# Patient Record
Sex: FEMALE | Race: WHITE | NOT HISPANIC OR LATINO | Employment: PART TIME | URBAN - METROPOLITAN AREA
[De-identification: names, ages, dates, MRNs, and addresses within clinical notes are randomized per-mention and may not be internally consistent; named-entity substitution may affect disease eponyms.]

---

## 2017-05-30 ENCOUNTER — ALLSCRIPTS OFFICE VISIT (OUTPATIENT)
Dept: OTHER | Facility: OTHER | Age: 57
End: 2017-05-30

## 2018-01-14 VITALS
BODY MASS INDEX: 39.2 KG/M2 | WEIGHT: 221.25 LBS | HEIGHT: 63 IN | SYSTOLIC BLOOD PRESSURE: 132 MMHG | DIASTOLIC BLOOD PRESSURE: 82 MMHG

## 2018-07-31 ENCOUNTER — TRANSCRIBE ORDERS (OUTPATIENT)
Dept: ADMINISTRATIVE | Facility: HOSPITAL | Age: 58
End: 2018-07-31

## 2018-07-31 DIAGNOSIS — Z12.39 SCREENING BREAST EXAMINATION: Primary | ICD-10-CM

## 2018-08-06 ENCOUNTER — HOSPITAL ENCOUNTER (OUTPATIENT)
Dept: MAMMOGRAPHY | Facility: MEDICAL CENTER | Age: 58
Discharge: HOME | End: 2018-08-06
Payer: COMMERCIAL

## 2018-08-06 DIAGNOSIS — Z12.39 SCREENING BREAST EXAMINATION: ICD-10-CM

## 2018-08-06 PROCEDURE — 77067 SCR MAMMO BI INCL CAD: CPT

## 2018-08-06 PROCEDURE — 77063 BREAST TOMOSYNTHESIS BI: CPT

## 2018-08-13 ENCOUNTER — ANNUAL EXAM (OUTPATIENT)
Dept: GYNECOLOGY | Facility: CLINIC | Age: 58
End: 2018-08-13
Payer: COMMERCIAL

## 2018-08-13 VITALS
SYSTOLIC BLOOD PRESSURE: 138 MMHG | HEIGHT: 65 IN | BODY MASS INDEX: 39.79 KG/M2 | WEIGHT: 238.8 LBS | DIASTOLIC BLOOD PRESSURE: 88 MMHG

## 2018-08-13 DIAGNOSIS — Z01.419 ENCOUNTER FOR ROUTINE GYNECOLOGICAL EXAMINATION WITH PAPANICOLAOU SMEAR OF CERVIX: Primary | ICD-10-CM

## 2018-08-13 DIAGNOSIS — Z12.4 ENCOUNTER FOR PAPANICOLAOU SMEAR FOR CERVICAL CANCER SCREENING: ICD-10-CM

## 2018-08-13 PROCEDURE — 99396 PREV VISIT EST AGE 40-64: CPT | Performed by: NURSE PRACTITIONER

## 2018-08-13 RX ORDER — PRAMIPEXOLE DIHYDROCHLORIDE 0.12 MG/1
0.12 TABLET ORAL DAILY
COMMUNITY

## 2018-08-13 NOTE — PROGRESS NOTES
Subjective      Mikey Shannon is a 63 y o  female who presents for annual exam  The patient is post menopausal and voices no complaints today  The patient is sexually active  GYN screening history: last pap: was normal and last mammogram: was normal  The patient is not taking hormone replacement therapy  Patient denies post-menopausal vaginal bleeding      The patient participates in regular exercise: no  She relates that she has an occasional hot flash but overall these are tolerable  History of abnormal Pap smear: no  Family history of breast cancer: no  Past Medical History:   Diagnosis Date    Attention deficit disorder     Supraventricular tachycardia ([address] )     Vein disorder      Family History   Problem Relation Age of Onset    Hypertension Mother     Diabetes Mother     Hyperlipidemia Mother     Pancreatic cancer Father     Hypertension Sister     Hypertension Brother      Past Surgical History:   Procedure Laterality Date    APPENDECTOMY      INDUCED        Social History     Social History    Marital status: /Civil Union     Spouse name: N/A    Number of children: N/A    Years of education: N/A     Occupational History    Not on file       Social History Main Topics    Smoking status: Former Smoker    Smokeless tobacco: Never Used    Alcohol use Yes      Comment: moderate    Drug use: No    Sexual activity: Yes     Partners: Male     Birth control/ protection: Post-menopausal     Other Topics Concern    Not on file     Social History Narrative    No narrative on file       Current Outpatient Prescriptions:     pramipexole (MIRAPEX) 0 125 mg tablet, Take 0 125 mg by mouth daily, Disp: , Rfl:       Review of Systems  Constitutional :no fever, feels well, no tiredness, no recent weight gain or loss  Cardiovascular: no complaints of slow or fast heart beat, no chest pain, no palpitations  Respiratory: no complaints of shortness of shortness of breath, no BALL  Breasts:no complaints of breast pain, breast lump, or nipple discharge  Gastrointestinal: no complaints of abdominal pain, constipation,nausea, vomiting, or diarrhea or bloody stools  Genitourinary : no complaints of dysuria, incontinence, pelvic pain, dysmenorrhea,vaginal discharge or abnormal vaginal bleeding  Integumentary: no complaints of skin rash or lesion,itching or dry skin  Neurological: no complaints of headache,numbness, tingling, dizziness or fainting       Objective      /88   Ht 5' 5" (1 651 m)   Wt 108 kg (238 lb 12 8 oz) Comment: 65  BMI 39 74 kg/m²     General:   appears stated age","cooperative","alert" normal mood and affect   Neck: Neck: normal, supple,trachea midline, no masses   Heart: regular rate and rhythm, S1, S2 normal, no murmur, click, rub or gallop   Lungs: clear to auscultation bilaterally   Breasts: Breast exam :normal, no dimpling or skin changes noted   Abdomen: soft, non-tender, without masses or organomegaly   Vulva: Normal , no lesions   Vagina: normal , no lesions or dryness   Urethra: normal   Cervix: Normal, no palpable masses A pap smear with HPV was done   Uterus: Normal , non-tender,not enlarged,no palpable masses   Adnexa: Normal, non-tender without fullness or masses                         Assessment     Normal GYN exam     Plan      All questions answered  Await pap smear results  Breast self exam technique reviewed and patient encouraged to perform self-exam monthly  Dietary diary  Follow up as needed  Mammogram   Thin prep Pap smear  The pt  declines a colonoscopy but states that she just did the ColoGard test     She is UTD with her osteoporosis screening

## 2018-08-14 LAB
CYTOLOGIST CVX/VAG CYTO: NORMAL
DX ICD CODE: NORMAL
HPV I/H RISK 1 DNA CVX QL PROBE+SIG AMP: NEGATIVE
OTHER STN SPEC: NORMAL
PATH REPORT.FINAL DX SPEC: NORMAL
SL AMB NOTE:: NORMAL
SL AMB SPECIMEN ADEQUACY: NORMAL
SL AMB TEST METHODOLOGY: NORMAL

## 2021-03-31 DIAGNOSIS — Z23 ENCOUNTER FOR IMMUNIZATION: ICD-10-CM

## 2021-05-05 ENCOUNTER — TRANSCRIBE ORDERS (OUTPATIENT)
Dept: ADMINISTRATIVE | Facility: HOSPITAL | Age: 61
End: 2021-05-05

## 2021-05-05 DIAGNOSIS — Z12.31 SCREENING MAMMOGRAM FOR HIGH-RISK PATIENT: Primary | ICD-10-CM

## 2021-05-11 NOTE — PROGRESS NOTES
Assessment/Plan:    Discussed atrophy/vaginal estrogen cream/lubricant  Patient will start premarin vaginal cream 2x per week  Risks/benefits/instructions for use reviewed  Recommended monthly SBE, annual CBE and annual screening mammo  ASCCP guidelines reviewed and pap with cotesting done today  Colon screening UTD  The patient denies STI risk factors and declines testing at this time  Reviewed diet/activity recommendations Calcium 1913-9235 mg and Vit D 600-1000 IU daily  Discussed postmenopausal considerations and symptoms to report  Kegel exercises as instructed  RTO in one year for routine annual gyn exam or sooner PRN  Diagnoses and all orders for this visit:    Encounter for gynecological examination (general) (routine) without abnormal findings    Encounter for Papanicolaou smear for cervical cancer screening  -     Liquid-based pap, screening    Vaginal atrophy  -     conjugated estrogens (PREMARIN) vaginal cream; Insert 0 5 g into the vagina 2 (two) times a week    Dyspareunia in female  -     conjugated estrogens (PREMARIN) vaginal cream; Insert 0 5 g into the vagina 2 (two) times a week    Subjective:      Patient ID: Madonna Damon is a 62 y o  female  This patient presents for routine annual gyn exam  She was last seen in our office by LAMONT Romero 5/44/[birth year]  Reports vaginal dryness with dyspareunia  She denies  bleeding or spotting, VM sx, pelvic pain, breast concerns, abnormal discharge, bowel/bladder dysfunction, depression/anx  , sexually active and is monogamous  Denies STI concerns  No hx of STIs  Pap/HPV up to date and normal, 8/13/18  Mammography ordered  Colon screening done with Cologuard in 2021          The following portions of the patient's history were reviewed and updated as appropriate: allergies, current medications, past family history, past medical history, past social history, past surgical history and problem list     Review of Systems Constitutional: Negative  Respiratory: Negative  Cardiovascular: Negative  Gastrointestinal: Negative  Endocrine: Negative  Genitourinary: Positive for dyspareunia  Negative for dysuria, frequency, pelvic pain, urgency, vaginal bleeding, vaginal discharge and vaginal pain  Musculoskeletal: Negative  Skin: Negative  Neurological: Negative  Psychiatric/Behavioral: Negative  Objective:      /72   Pulse 78   Ht 5' 2 5" (1 588 m)   Wt 102 kg (225 lb 12 8 oz)   BMI 40 64 kg/m²          Physical Exam  Vitals signs and nursing note reviewed  Exam conducted with a chaperone present  Constitutional:       Appearance: Normal appearance  She is well-developed  HENT:      Head: Normocephalic and atraumatic  Neck:      Musculoskeletal: Normal range of motion and neck supple  Thyroid: No thyroid mass or thyromegaly  Cardiovascular:      Rate and Rhythm: Normal rate and regular rhythm  Heart sounds: Normal heart sounds  Pulmonary:      Effort: Pulmonary effort is normal       Breath sounds: Normal breath sounds  Chest:      Breasts: Breasts are symmetrical          Right: No inverted nipple, mass, nipple discharge, skin change or tenderness  Left: No inverted nipple, mass, nipple discharge, skin change or tenderness  Abdominal:      General: Bowel sounds are normal       Palpations: Abdomen is soft  Tenderness: There is no abdominal tenderness  Hernia: There is no hernia in the left inguinal area or right inguinal area  Genitourinary:     General: Normal vulva  Exam position: Supine  Pubic Area: No rash  Labia:         Right: No rash, tenderness, lesion or injury  Left: No rash, tenderness, lesion or injury  Urethra: No prolapse, urethral pain, urethral swelling or urethral lesion  Vagina: Normal  No signs of injury and foreign body   No vaginal discharge, erythema, tenderness, bleeding, lesions or prolapsed vaginal walls  Cervix: No cervical motion tenderness, discharge, friability, lesion, erythema, cervical bleeding or eversion  Uterus: Not deviated, not enlarged, not fixed, not tender and no uterine prolapse  Adnexa:         Right: No mass, tenderness or fullness  Left: No mass, tenderness or fullness  Rectum: No external hemorrhoid  Comments: Urethra normal without lesions  No bladder tenderness  Moderate VVA  Musculoskeletal: Normal range of motion  Lymphadenopathy:      Lower Body: No right inguinal adenopathy  No left inguinal adenopathy  Skin:     General: Skin is warm and dry  Neurological:      Mental Status: She is alert and oriented to person, place, and time  Psychiatric:         Speech: Speech normal          Behavior: Behavior normal  Behavior is cooperative

## 2021-05-12 ENCOUNTER — TELEPHONE (OUTPATIENT)
Dept: GYNECOLOGY | Facility: CLINIC | Age: 61
End: 2021-05-12

## 2021-05-12 ENCOUNTER — ANNUAL EXAM (OUTPATIENT)
Dept: GYNECOLOGY | Facility: CLINIC | Age: 61
End: 2021-05-12
Payer: COMMERCIAL

## 2021-05-12 VITALS
SYSTOLIC BLOOD PRESSURE: 118 MMHG | WEIGHT: 225.8 LBS | BODY MASS INDEX: 40.01 KG/M2 | DIASTOLIC BLOOD PRESSURE: 72 MMHG | HEIGHT: 63 IN | HEART RATE: 78 BPM

## 2021-05-12 DIAGNOSIS — Z12.4 ENCOUNTER FOR PAPANICOLAOU SMEAR FOR CERVICAL CANCER SCREENING: ICD-10-CM

## 2021-05-12 DIAGNOSIS — N95.2 VAGINAL ATROPHY: ICD-10-CM

## 2021-05-12 DIAGNOSIS — Z01.419 ENCOUNTER FOR GYNECOLOGICAL EXAMINATION (GENERAL) (ROUTINE) WITHOUT ABNORMAL FINDINGS: Primary | ICD-10-CM

## 2021-05-12 DIAGNOSIS — N94.10 DYSPAREUNIA IN FEMALE: ICD-10-CM

## 2021-05-12 PROCEDURE — G0145 SCR C/V CYTO,THINLAYER,RESCR: HCPCS | Performed by: OBSTETRICS & GYNECOLOGY

## 2021-05-12 PROCEDURE — 99396 PREV VISIT EST AGE 40-64: CPT | Performed by: OBSTETRICS & GYNECOLOGY

## 2021-05-12 PROCEDURE — 87624 HPV HI-RISK TYP POOLED RSLT: CPT | Performed by: OBSTETRICS & GYNECOLOGY

## 2021-05-12 RX ORDER — VERAPAMIL HYDROCHLORIDE 40 MG/1
120 TABLET ORAL DAILY
COMMUNITY

## 2021-05-12 RX ORDER — FLUOXETINE HYDROCHLORIDE 20 MG/1
10 CAPSULE ORAL DAILY
COMMUNITY

## 2021-05-12 NOTE — TELEPHONE ENCOUNTER
Kayleigh called and C/O medication being to expensive  Pt  States She did not receive a hand out, when asked  John Ventura about other options since She may not have her husbands insurance

## 2021-05-13 LAB
HPV HR 12 DNA CVX QL NAA+PROBE: NEGATIVE
HPV16 DNA CVX QL NAA+PROBE: NEGATIVE
HPV18 DNA CVX QL NAA+PROBE: NEGATIVE

## 2021-05-17 LAB
LAB AP GYN PRIMARY INTERPRETATION: NORMAL
Lab: NORMAL

## 2021-05-19 ENCOUNTER — HOSPITAL ENCOUNTER (OUTPATIENT)
Dept: MAMMOGRAPHY | Facility: MEDICAL CENTER | Age: 61
Discharge: HOME | End: 2021-05-19
Payer: COMMERCIAL

## 2021-05-19 VITALS — WEIGHT: 225 LBS | HEIGHT: 63 IN | BODY MASS INDEX: 39.87 KG/M2

## 2021-05-19 DIAGNOSIS — Z12.31 SCREENING MAMMOGRAM FOR HIGH-RISK PATIENT: ICD-10-CM

## 2021-05-19 PROCEDURE — 77067 SCR MAMMO BI INCL CAD: CPT

## 2021-05-19 PROCEDURE — 77063 BREAST TOMOSYNTHESIS BI: CPT

## 2021-12-06 ENCOUNTER — OFFICE VISIT (OUTPATIENT)
Dept: GYNECOLOGY | Facility: CLINIC | Age: 61
End: 2021-12-06
Payer: COMMERCIAL

## 2021-12-06 VITALS
HEIGHT: 63 IN | BODY MASS INDEX: 39.51 KG/M2 | HEART RATE: 76 BPM | DIASTOLIC BLOOD PRESSURE: 74 MMHG | SYSTOLIC BLOOD PRESSURE: 118 MMHG | WEIGHT: 223 LBS

## 2021-12-06 DIAGNOSIS — N76.6 VULVAR ULCER: Primary | ICD-10-CM

## 2021-12-06 PROCEDURE — 99213 OFFICE O/P EST LOW 20 MIN: CPT | Performed by: OBSTETRICS & GYNECOLOGY

## 2021-12-06 PROCEDURE — 87255 GENET VIRUS ISOLATE HSV: CPT | Performed by: OBSTETRICS & GYNECOLOGY

## 2021-12-10 ENCOUNTER — TELEPHONE (OUTPATIENT)
Dept: GYNECOLOGY | Facility: CLINIC | Age: 61
End: 2021-12-10

## 2021-12-10 LAB — HSV SPEC CULT: NORMAL

## 2022-05-10 NOTE — PROGRESS NOTES
Assessment/Plan:    Will start tiramcinolone cream in very thin layer bid for 2 weeks, daily for 2 weeks and then once a week  Risks of overuse reviewed  Will restart vaginal estrogen cream 1/2 gm 2x per week  Information given on alternatives to cream to check cost with insurance company  Recommended monthly SBE, annual CBE and annual screening mammo  ASCCP guidelines reviewed and pap with cotesting noted to be up to date; this low risk patient was advised she meets criteria to d/c pap screening at age 73  No pap done today  DEXA script given and colon screening  noted to be up to date  Instructed to check ins co for coverage  Reviewed diet/activity recommendations Calcium 5843-7836 mg and Vit D 600-1000 IU daily  Discussed postmenopausal considerations and symptoms to report  Kegel exercises as instructed  RTO in one year for routine annual gyn exam or sooner PRN  Diagnoses and all orders for this visit:    Encounter for gynecological examination (general) (routine) with abnormal findings    Screening mammogram for breast cancer  -     Mammo screening bilateral w 3d & cad; Future    Osteoporosis screening  -     DXA bone density spine hip and pelvis; Future    Menopause  -     DXA bone density spine hip and pelvis; Future    Chronic vulvitis  -     triamcinolone (KENALOG) 0 1 % cream; Apply topically 2 (two) times a day Apply to vulva in very thin layer bid for 2 weeks, daily for 2 weeks and then once a week        Subjective:      Patient ID: Becca Horowitz is a 62 y o  female  This patient presents for routine annual gyn exam   Continues with vulvar itching on and off  Using conservative personal hygiene  Stopped using vaginal estrogen  She denies  bleeding or spotting, VM sx, pelvic pain, breast concerns, abnormal discharge, bowel/bladder dysfunction, depression/anx  , sexually active and is monogamous  Pap/HPV up to date and normal, 5/12/21    Mammography up to date and normal, 5/19/21  Osteoporosis screening not done to date  Cologuard 2021  The following portions of the patient's history were reviewed and updated as appropriate: allergies, current medications, past family history, past medical history, past social history, past surgical history and problem list     Review of Systems   Constitutional: Negative  Respiratory: Negative  Cardiovascular: Negative  Gastrointestinal: Negative  Endocrine: Negative  Genitourinary: Negative for dysuria, frequency, pelvic pain, urgency, vaginal bleeding, vaginal discharge and vaginal pain  Musculoskeletal: Negative  Skin: Negative  Neurological: Negative  Psychiatric/Behavioral: Negative  Objective:      /80   Pulse 87   Ht 5' 2 5" (1 588 m)   Wt 102 kg (225 lb 3 2 oz)   BMI 40 53 kg/m²          Physical Exam  Vitals and nursing note reviewed  Exam conducted with a chaperone present  Constitutional:       Appearance: Normal appearance  She is well-developed  HENT:      Head: Normocephalic and atraumatic  Neck:      Thyroid: No thyroid mass or thyromegaly  Cardiovascular:      Rate and Rhythm: Normal rate and regular rhythm  Heart sounds: Normal heart sounds  Pulmonary:      Effort: Pulmonary effort is normal       Breath sounds: Normal breath sounds  Chest:   Breasts: Breasts are symmetrical       Right: No inverted nipple, mass, nipple discharge, skin change or tenderness  Left: No inverted nipple, mass, nipple discharge, skin change or tenderness  Abdominal:      General: Bowel sounds are normal       Palpations: Abdomen is soft  Tenderness: There is no abdominal tenderness  Hernia: There is no hernia in the left inguinal area or right inguinal area  Genitourinary:     General: Normal vulva  Exam position: Supine  Pubic Area: Rash (diffuse erythema throughout) present  Labia:         Right: No tenderness, lesion or injury           Left: No tenderness, lesion or injury  Urethra: No prolapse, urethral pain, urethral swelling or urethral lesion  Vagina: Normal  No signs of injury and foreign body  No vaginal discharge, erythema, tenderness, bleeding, lesions or prolapsed vaginal walls  Cervix: No cervical motion tenderness, discharge, friability, lesion, erythema, cervical bleeding or eversion  Uterus: Not deviated, not enlarged, not fixed, not tender and no uterine prolapse  Adnexa:         Right: No mass, tenderness or fullness  Left: No mass, tenderness or fullness  Rectum: No external hemorrhoid  Comments: Urethra normal without lesions  No bladder tenderness  Moderate to severe erythema  Musculoskeletal:         General: Normal range of motion  Cervical back: Normal range of motion and neck supple  Lymphadenopathy:      Lower Body: No right inguinal adenopathy  No left inguinal adenopathy  Skin:     General: Skin is warm and dry  Neurological:      Mental Status: She is alert and oriented to person, place, and time  Psychiatric:         Speech: Speech normal          Behavior: Behavior normal  Behavior is cooperative

## 2022-05-13 ENCOUNTER — ANNUAL EXAM (OUTPATIENT)
Dept: GYNECOLOGY | Facility: CLINIC | Age: 62
End: 2022-05-13
Payer: COMMERCIAL

## 2022-05-13 VITALS
DIASTOLIC BLOOD PRESSURE: 80 MMHG | SYSTOLIC BLOOD PRESSURE: 120 MMHG | BODY MASS INDEX: 39.9 KG/M2 | WEIGHT: 225.2 LBS | HEART RATE: 87 BPM | HEIGHT: 63 IN

## 2022-05-13 DIAGNOSIS — Z78.0 MENOPAUSE: ICD-10-CM

## 2022-05-13 DIAGNOSIS — Z01.411 ENCOUNTER FOR GYNECOLOGICAL EXAMINATION (GENERAL) (ROUTINE) WITH ABNORMAL FINDINGS: Primary | ICD-10-CM

## 2022-05-13 DIAGNOSIS — Z13.820 OSTEOPOROSIS SCREENING: ICD-10-CM

## 2022-05-13 DIAGNOSIS — Z12.31 SCREENING MAMMOGRAM FOR BREAST CANCER: ICD-10-CM

## 2022-05-13 DIAGNOSIS — N76.3 CHRONIC VULVITIS: ICD-10-CM

## 2022-05-13 PROCEDURE — 99396 PREV VISIT EST AGE 40-64: CPT | Performed by: OBSTETRICS & GYNECOLOGY

## 2022-05-13 RX ORDER — TRIAMCINOLONE ACETONIDE 1 MG/G
CREAM TOPICAL 2 TIMES DAILY
Qty: 60 G | Refills: 0 | Status: SHIPPED | OUTPATIENT
Start: 2022-05-13

## 2022-05-25 ENCOUNTER — HOSPITAL ENCOUNTER (OUTPATIENT)
Dept: MAMMOGRAPHY | Facility: MEDICAL CENTER | Age: 62
Discharge: HOME | End: 2022-05-25
Payer: COMMERCIAL

## 2022-05-25 VITALS — HEIGHT: 63 IN | BODY MASS INDEX: 39.87 KG/M2 | WEIGHT: 225 LBS

## 2022-05-25 DIAGNOSIS — Z12.31 SCREENING MAMMOGRAM FOR BREAST CANCER: ICD-10-CM

## 2022-05-25 PROCEDURE — 77063 BREAST TOMOSYNTHESIS BI: CPT

## 2022-05-25 PROCEDURE — 77067 SCR MAMMO BI INCL CAD: CPT

## 2022-06-02 ENCOUNTER — HOSPITAL ENCOUNTER (OUTPATIENT)
Dept: BONE DENSITY | Facility: MEDICAL CENTER | Age: 62
Discharge: HOME | End: 2022-06-02
Payer: COMMERCIAL

## 2022-06-02 DIAGNOSIS — Z78.0 MENOPAUSE: ICD-10-CM

## 2022-06-02 DIAGNOSIS — Z13.820 OSTEOPOROSIS SCREENING: ICD-10-CM

## 2022-06-02 PROCEDURE — 77080 DXA BONE DENSITY AXIAL: CPT

## 2023-05-12 NOTE — PROGRESS NOTES
Assessment/Plan:    Encouraged to use triamcinolone cream once weekly in very thin layer and estrace cream 1/2 gm 2x per week  Recommended monthly SBE, annual CBE and annual screening mammo  ASCCP guidelines reviewed and pap with cotesting noted to be up to date; this low risk patient was advised she meets criteria to d/c pap screening at age 73  No pap done  DEXA and colonoscopy noted to be up to date  Reviewed diet/activity recommendations Calcium 1200 mg and Vit D 600-1000 IU daily  Discussed postmenopausal considerations and symptoms to report  Kegel exercises as instructed  RTO in one year for routine annual gyn exam or sooner PRN  There are no diagnoses linked to this encounter  Subjective:      Patient ID: Claire Gonzales is a 65 y o  female  This patient presents for routine annual gyn exam   History of vulvar itching on and off  Started on triamcinolone cream and restarted on vaginal estrogen at last visit  Pt states she is not using creams and does experience sx  She knows she should use creams and was encouraged to do so  She denies  bleeding or spotting, VM sx, pelvic pain, breast concerns, abnormal discharge, bowel/bladder dysfunction, depression/anx  , sexually active and is monogamous  Pap/HPV up to date and normal, 5/12/21  Mammography up to date and normal, 5/25/22  Osteoporosis screening, 6/2/22, osteopenia  Cologuard 2021  The following portions of the patient's history were reviewed and updated as appropriate: allergies, current medications, past family history, past medical history, past social history, past surgical history and problem list     Review of Systems   Constitutional: Negative  Respiratory: Negative  Cardiovascular: Negative  Gastrointestinal: Negative  Endocrine: Negative  Genitourinary: Negative for dysuria, frequency, pelvic pain, urgency, vaginal bleeding, vaginal discharge and vaginal pain  Musculoskeletal: Negative      Skin: Negative  Neurological: Negative  Psychiatric/Behavioral: Negative  Objective: There were no vitals taken for this visit  Physical Exam  Vitals and nursing note reviewed  Exam conducted with a chaperone present  Constitutional:       Appearance: Normal appearance  She is well-developed  HENT:      Head: Normocephalic and atraumatic  Neck:      Thyroid: No thyroid mass or thyromegaly  Cardiovascular:      Rate and Rhythm: Normal rate and regular rhythm  Heart sounds: Normal heart sounds  Pulmonary:      Effort: Pulmonary effort is normal       Breath sounds: Normal breath sounds  Chest:   Breasts:     Breasts are symmetrical       Right: No inverted nipple, mass, nipple discharge, skin change or tenderness  Left: No inverted nipple, mass, nipple discharge, skin change or tenderness  Abdominal:      General: Bowel sounds are normal       Palpations: Abdomen is soft  Tenderness: There is no abdominal tenderness  Hernia: There is no hernia in the left inguinal area or right inguinal area  Genitourinary:     General: Normal vulva  Exam position: Supine  Pubic Area: No rash  Labia:         Right: No rash, tenderness, lesion or injury  Left: No rash, tenderness, lesion or injury  Urethra: No prolapse, urethral pain, urethral swelling or urethral lesion  Vagina: Normal  No signs of injury and foreign body  No vaginal discharge, erythema, tenderness, bleeding, lesions or prolapsed vaginal walls  Cervix: No cervical motion tenderness, discharge, friability, lesion, erythema, cervical bleeding or eversion  Uterus: Not deviated, not enlarged, not fixed, not tender and no uterine prolapse  Adnexa:         Right: No mass, tenderness or fullness  Left: No mass, tenderness or fullness  Rectum: No external hemorrhoid        Comments: Urethra normal without lesions  No bladder tenderness  Musculoskeletal: General: Normal range of motion  Cervical back: Normal range of motion and neck supple  Lymphadenopathy:      Lower Body: No right inguinal adenopathy  No left inguinal adenopathy  Skin:     General: Skin is warm and dry  Neurological:      Mental Status: She is alert and oriented to person, place, and time  Psychiatric:         Speech: Speech normal          Behavior: Behavior normal  Behavior is cooperative

## 2023-05-15 ENCOUNTER — ANNUAL EXAM (OUTPATIENT)
Dept: GYNECOLOGY | Facility: CLINIC | Age: 63
End: 2023-05-15

## 2023-05-15 VITALS
HEART RATE: 77 BPM | SYSTOLIC BLOOD PRESSURE: 110 MMHG | DIASTOLIC BLOOD PRESSURE: 70 MMHG | WEIGHT: 223.6 LBS | BODY MASS INDEX: 39.62 KG/M2 | HEIGHT: 63 IN

## 2023-05-15 DIAGNOSIS — Z12.31 SCREENING MAMMOGRAM FOR BREAST CANCER: ICD-10-CM

## 2023-05-15 DIAGNOSIS — Z01.419 ENCOUNTER FOR GYNECOLOGICAL EXAMINATION (GENERAL) (ROUTINE) WITHOUT ABNORMAL FINDINGS: Primary | ICD-10-CM

## 2023-07-26 ENCOUNTER — HOSPITAL ENCOUNTER (OUTPATIENT)
Dept: MAMMOGRAPHY | Facility: MEDICAL CENTER | Age: 63
Discharge: HOME | End: 2023-07-26
Payer: COMMERCIAL

## 2023-07-26 VITALS — WEIGHT: 223 LBS | HEIGHT: 63 IN | BODY MASS INDEX: 39.51 KG/M2

## 2023-07-26 DIAGNOSIS — Z12.31 SCREENING MAMMOGRAM FOR BREAST CANCER: ICD-10-CM

## 2023-07-26 PROCEDURE — 77063 BREAST TOMOSYNTHESIS BI: CPT

## 2023-07-26 PROCEDURE — 77067 SCR MAMMO BI INCL CAD: CPT

## 2024-05-28 ENCOUNTER — ANNUAL EXAM (OUTPATIENT)
Dept: GYNECOLOGY | Facility: CLINIC | Age: 64
End: 2024-05-28
Payer: COMMERCIAL

## 2024-05-28 VITALS
HEIGHT: 63 IN | HEART RATE: 76 BPM | DIASTOLIC BLOOD PRESSURE: 78 MMHG | SYSTOLIC BLOOD PRESSURE: 124 MMHG | BODY MASS INDEX: 39.5 KG/M2

## 2024-05-28 DIAGNOSIS — Z78.0 MENOPAUSE: ICD-10-CM

## 2024-05-28 DIAGNOSIS — Z13.820 OSTEOPOROSIS SCREENING: ICD-10-CM

## 2024-05-28 DIAGNOSIS — Z01.419 ENCOUNTER FOR GYNECOLOGICAL EXAMINATION WITH PAPANICOLAOU SMEAR OF CERVIX: ICD-10-CM

## 2024-05-28 DIAGNOSIS — Z01.419 ENCOUNTER FOR GYNECOLOGICAL EXAMINATION (GENERAL) (ROUTINE) WITHOUT ABNORMAL FINDINGS: Primary | ICD-10-CM

## 2024-05-28 PROCEDURE — 99396 PREV VISIT EST AGE 40-64: CPT | Performed by: OBSTETRICS & GYNECOLOGY

## 2024-05-28 PROCEDURE — G0145 SCR C/V CYTO,THINLAYER,RESCR: HCPCS | Performed by: OBSTETRICS & GYNECOLOGY

## 2024-05-28 PROCEDURE — G0476 HPV COMBO ASSAY CA SCREEN: HCPCS | Performed by: OBSTETRICS & GYNECOLOGY

## 2024-05-28 RX ORDER — METHYLPHENIDATE HYDROCHLORIDE 54 MG/1
54 TABLET ORAL DAILY
COMMUNITY
Start: 2024-04-23

## 2024-05-28 RX ORDER — VERAPAMIL HYDROCHLORIDE 120 MG/1
CAPSULE, EXTENDED RELEASE ORAL
COMMUNITY
Start: 2024-04-25

## 2024-05-28 NOTE — PROGRESS NOTES
Assessment/Plan:     Recommended monthly SBE, annual CBE and annual screening mammo. ASCCP guidelines reviewed and pap with cotesting noted to be up to date; this low risk patient was advised she meets criteria to d/c pap screening at age 66. Pap done. DEXA script given and colonoscopy noted to be up to date.  Reviewed diet/activity recommendations Calcium 1200 mg and Vit D 600-1000 IU daily.  Discussed postmenopausal considerations and symptoms to report. Kegel exercises as instructed. RTO in one year for routine annual gyn exam or sooner PRN.          Diagnoses and all orders for this visit:    Encounter for gynecological examination (general) (routine) without abnormal findings    Encounter for gynecological examination with Papanicolaou smear of cervix  -     Liquid-based pap, screening    Osteoporosis screening  -     DXA bone density spine hip and pelvis; Future    Menopause  -     DXA bone density spine hip and pelvis; Future    Other orders  -     methylphenidate (CONCERTA) 54 MG ER tablet; Take 54 mg by mouth daily  -     verapamil (Verelan) 120 MG 24 hr capsule        Subjective:      Patient ID: Kayleigh Del Valle is a 64 y.o. female.    This patient presents for routine annual gyn exam.  She denies  bleeding or spotting, VM sx, pelvic pain, breast concerns, abnormal discharge, bowel/bladder dysfunction, depression/anx.   , not sexually active and is monogamous.   Pap/HPV up to date and normal, 5/12/21.  Mammography up to date and normal, 5/25/22.  Osteoporosis screening, 6/2/22, osteopenia. Cologuard 2021.          The following portions of the patient's history were reviewed and updated as appropriate: allergies, current medications, past family history, past medical history, past social history, past surgical history and problem list.    Review of Systems   Constitutional: Negative.    Respiratory: Negative.     Cardiovascular: Negative.    Gastrointestinal: Negative.    Endocrine: Negative.   "  Genitourinary:  Negative for dyspareunia, dysuria, frequency, pelvic pain, urgency, vaginal bleeding, vaginal discharge and vaginal pain.   Musculoskeletal: Negative.    Skin: Negative.    Neurological: Negative.    Psychiatric/Behavioral: Negative.           Objective:      /78   Pulse 76   Ht 5' 3\" (1.6 m)   BMI 39.50 kg/m²          Physical Exam  Vitals and nursing note reviewed. Exam conducted with a chaperone present.   Constitutional:       Appearance: Normal appearance. She is well-developed.   HENT:      Head: Normocephalic and atraumatic.   Neck:      Thyroid: No thyroid mass or thyromegaly.   Cardiovascular:      Rate and Rhythm: Normal rate and regular rhythm.      Heart sounds: Normal heart sounds.   Pulmonary:      Effort: Pulmonary effort is normal.      Breath sounds: Normal breath sounds.   Chest:   Breasts:     Breasts are symmetrical.      Right: No inverted nipple, mass, nipple discharge, skin change or tenderness.      Left: No inverted nipple, mass, nipple discharge, skin change or tenderness.   Abdominal:      General: Bowel sounds are normal.      Palpations: Abdomen is soft.      Tenderness: There is no abdominal tenderness.      Hernia: There is no hernia in the left inguinal area or right inguinal area.   Genitourinary:     General: Normal vulva.      Exam position: Supine.      Pubic Area: No rash.       Labia:         Right: No rash, tenderness, lesion or injury.         Left: No rash, tenderness, lesion or injury.       Urethra: No prolapse, urethral pain, urethral swelling or urethral lesion.      Vagina: Normal. No signs of injury and foreign body. No vaginal discharge, erythema, tenderness, bleeding, lesions or prolapsed vaginal walls.      Cervix: No cervical motion tenderness, discharge, friability, lesion, erythema, cervical bleeding or eversion.      Uterus: Not deviated, not enlarged, not fixed, not tender and no uterine prolapse.       Adnexa:         Right: No mass, " tenderness or fullness.          Left: No mass, tenderness or fullness.        Rectum: No external hemorrhoid.      Comments: Urethra normal without lesions  No bladder tenderness  Exam limited by body habitus  Musculoskeletal:         General: Normal range of motion.      Cervical back: Normal range of motion and neck supple.   Lymphadenopathy:      Lower Body: No right inguinal adenopathy. No left inguinal adenopathy.   Skin:     General: Skin is warm and dry.   Neurological:      Mental Status: She is alert and oriented to person, place, and time.   Psychiatric:         Speech: Speech normal.         Behavior: Behavior normal. Behavior is cooperative.

## 2024-06-05 LAB
LAB AP GYN PRIMARY INTERPRETATION: NORMAL
Lab: NORMAL

## 2024-09-18 ENCOUNTER — HOSPITAL ENCOUNTER (OUTPATIENT)
Dept: BONE DENSITY | Facility: MEDICAL CENTER | Age: 64
Discharge: HOME | End: 2024-09-18
Payer: COMMERCIAL

## 2024-09-18 DIAGNOSIS — Z78.0 MENOPAUSE: ICD-10-CM

## 2024-09-18 DIAGNOSIS — Z13.820 OSTEOPOROSIS SCREENING: ICD-10-CM

## 2024-09-18 PROCEDURE — 77080 DXA BONE DENSITY AXIAL: CPT

## 2024-09-30 ENCOUNTER — TELEPHONE (OUTPATIENT)
Dept: GYNECOLOGY | Facility: CLINIC | Age: 64
End: 2024-09-30

## 2024-09-30 NOTE — TELEPHONE ENCOUNTER
Spoke to patient and she will introduce the Vitamin D3 back into her regiment.     ----- Message from LAMONT Hyde sent at 9/30/2024  7:19 AM EDT -----  Pls inform pt of osteopenia on DXA with standard recommendations.

## 2025-05-27 NOTE — PROGRESS NOTES
"Name: Kayleigh Del Valle      : 1960      MRN: [id number]  Encounter Provider: LAMONT Howard  Encounter Date: 2025   Encounter department: [facility]  :  Assessment & Plan  Encounter for gynecological examination (general) (routine) without abnormal findings  Recommended monthly SBE, annual CBE and annual screening mammo. ASCCP guidelines reviewed and pap with cotesting noted to be up to date; this low risk patient was advised she meets criteria to d/c pap screening at age 66. Pap done. DEXA and cologuard noted to be up to date.  Diet/activity recommendations Calcium 1200 mg and Vit D 600-1000 IU daily.  Discussed postmenopausal considerations and symptoms to report. Kegel exercises as instructed. RTO in one year for routine annual gyn exam or sooner PRN.           Screening mammogram for breast cancer    Orders:    Mammo screening bilateral w 3d and cad; Future        History of Present Illness   This patient presents for routine annual gyn exam.  She denies  bleeding or spotting, VM sx, pelvic pain, breast concerns, abnormal discharge, bowel/bladder dysfunction, depression/anx.   , sexually active and is monogamous, monogamous.   Pap/HPV up to date and normal, 24. Mammography normal, 23. Osteoporosis screening, 24, osteopenia. Cologuard normal 24.            Review of Systems   Constitutional: Negative.    Respiratory: Negative.     Cardiovascular: Negative.    Gastrointestinal: Negative.    Endocrine: Negative.    Genitourinary:  Negative for dyspareunia, dysuria, frequency, pelvic pain, urgency, vaginal bleeding, vaginal discharge and vaginal pain.   Musculoskeletal: Negative.    Skin: Negative.    Neurological: Negative.    Psychiatric/Behavioral: Negative.            Objective   /64 (BP Location: Left arm, Patient Position: Sitting, Cuff Size: Large)   Ht 5' 3\" (1.6 m)   Wt 88.5 kg (195 lb)   BMI 34.54 kg/m²    "   Physical Exam  Vitals and nursing note reviewed.   Constitutional:       General: She is not in acute distress.     Appearance: She is well-developed.   HENT:      Head: Normocephalic and atraumatic.     Eyes:      Conjunctiva/sclera: Conjunctivae normal.       Cardiovascular:      Rate and Rhythm: Normal rate and regular rhythm.      Heart sounds: No murmur heard.  Pulmonary:      Effort: Pulmonary effort is normal. No respiratory distress.      Breath sounds: Normal breath sounds.   Chest:   Breasts:     Right: No swelling, bleeding, inverted nipple, mass, nipple discharge, skin change or tenderness.      Left: No swelling, bleeding, inverted nipple, mass, nipple discharge, skin change or tenderness.   Abdominal:      Palpations: Abdomen is soft.      Tenderness: There is no abdominal tenderness.   Genitourinary:     General: Normal vulva.      Exam position: Supine.      Pubic Area: No rash.       Labia:         Right: No rash, tenderness, lesion or injury.         Left: No rash, tenderness, lesion or injury.       Urethra: No urethral pain, urethral swelling or urethral lesion.      Vagina: No signs of injury and foreign body. No vaginal discharge, erythema, tenderness, bleeding, lesions or prolapsed vaginal walls.      Cervix: No cervical motion tenderness, discharge, friability, lesion, erythema, cervical bleeding or eversion.      Uterus: Not deviated, not enlarged, not fixed, not tender and no uterine prolapse.       Adnexa:         Right: No mass, tenderness or fullness.          Left: No mass, tenderness or fullness.       Musculoskeletal:         General: No swelling.      Cervical back: Neck supple.     Skin:     General: Skin is warm and dry.      Capillary Refill: Capillary refill takes less than 2 seconds.     Neurological:      Mental Status: She is alert.     Psychiatric:         Mood and Affect: Mood normal.

## 2025-05-30 ENCOUNTER — ANNUAL EXAM (OUTPATIENT)
Dept: GYNECOLOGY | Facility: CLINIC | Age: 65
End: 2025-05-30

## 2025-05-30 VITALS
DIASTOLIC BLOOD PRESSURE: 64 MMHG | BODY MASS INDEX: 34.55 KG/M2 | WEIGHT: 195 LBS | HEIGHT: 63 IN | SYSTOLIC BLOOD PRESSURE: 110 MMHG

## 2025-05-30 DIAGNOSIS — Z12.4 PAP SMEAR FOR CERVICAL CANCER SCREENING: ICD-10-CM

## 2025-05-30 DIAGNOSIS — Z12.31 SCREENING MAMMOGRAM FOR BREAST CANCER: ICD-10-CM

## 2025-05-30 DIAGNOSIS — Z01.419 ENCOUNTER FOR GYNECOLOGICAL EXAMINATION (GENERAL) (ROUTINE) WITHOUT ABNORMAL FINDINGS: Primary | ICD-10-CM

## 2025-05-30 PROCEDURE — G0145 SCR C/V CYTO,THINLAYER,RESCR: HCPCS | Performed by: OBSTETRICS & GYNECOLOGY

## 2025-05-30 PROCEDURE — G0476 HPV COMBO ASSAY CA SCREEN: HCPCS | Performed by: OBSTETRICS & GYNECOLOGY

## 2025-06-04 LAB
LAB AP GYN PRIMARY INTERPRETATION: NORMAL
Lab: NORMAL

## 2025-06-06 ENCOUNTER — RESULTS FOLLOW-UP (OUTPATIENT)
Dept: GYNECOLOGY | Facility: CLINIC | Age: 65
End: 2025-06-06

## 2025-08-06 ENCOUNTER — HOSPITAL ENCOUNTER (OUTPATIENT)
Dept: MAMMOGRAPHY | Facility: MEDICAL CENTER | Age: 65
Discharge: HOME | End: 2025-08-06
Payer: COMMERCIAL

## 2025-08-06 VITALS — HEIGHT: 63 IN | BODY MASS INDEX: 34.55 KG/M2 | WEIGHT: 195 LBS

## 2025-08-06 DIAGNOSIS — Z12.31 SCREENING MAMMOGRAM FOR BREAST CANCER: ICD-10-CM

## 2025-08-06 PROCEDURE — 77067 SCR MAMMO BI INCL CAD: CPT

## 2025-08-06 PROCEDURE — 77063 BREAST TOMOSYNTHESIS BI: CPT
